# Patient Record
Sex: FEMALE | Race: WHITE | NOT HISPANIC OR LATINO | URBAN - NONMETROPOLITAN AREA
[De-identification: names, ages, dates, MRNs, and addresses within clinical notes are randomized per-mention and may not be internally consistent; named-entity substitution may affect disease eponyms.]

---

## 2016-09-01 PROBLEM — H26.491: Noted: 2019-04-09

## 2016-09-01 PROBLEM — Z96.1: Noted: 2018-10-18

## 2016-09-01 PROBLEM — H02.879: Noted: 2018-10-18

## 2016-09-01 PROBLEM — Z96.1: Noted: 2019-04-09

## 2016-09-01 PROBLEM — H43.813: Noted: 2018-10-18

## 2016-09-01 PROBLEM — H52.4: Noted: 2018-10-18

## 2019-04-09 ENCOUNTER — IMPORTED ENCOUNTER (OUTPATIENT)
Dept: URBAN - NONMETROPOLITAN AREA CLINIC 1 | Facility: CLINIC | Age: 79
End: 2019-04-09

## 2019-04-09 PROCEDURE — 92012 INTRM OPH EXAM EST PATIENT: CPT

## 2019-04-09 NOTE — PATIENT DISCUSSION
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 - The risk of retinal detachment in patients with PVDs was discussed with the patient and the warning signs of retinal detachment were carefully reviewed with the patient. - The patient was warned to return to the office or contact the ophthalmologist on call immediately if they experience signs of retinal detachment or changes in vision noted from today. - Continue to monitor.  Patient defers MR today 4/9/19; 's Notes: MR  3/3/16 again 9/1/16 @ n/c deferred 4/9/19DFE   3/2/17[facility] 9/7/17

## 2020-06-03 ENCOUNTER — IMPORTED ENCOUNTER (OUTPATIENT)
Dept: URBAN - NONMETROPOLITAN AREA CLINIC 1 | Facility: CLINIC | Age: 80
End: 2020-06-03

## 2020-06-03 PROCEDURE — 92014 COMPRE OPH EXAM EST PT 1/>: CPT

## 2020-06-03 NOTE — PATIENT DISCUSSION
Type II DM dx in 2007- Discussed diagnosis in detail with patient - Stressed importance of good blood sugar control - No diabetic retinopathy seen today on dilated exam- Controlled with oral medication and diet at this time- Last AlC and blood sugar are unknown (patient states that she hates needles)- Letter to Viky Falcon - Continue to monitor Myokymia OS- Discussed diagnosis in detail with patient- Asymptomatic at this time- Continue to monitorPseudophakia OU with PCO OD - Discussed diagnosis in detail with patient- s/p YAG PC OU but OD shows PCO today but no treatment needed at this time - Continue to monitorPVD OU: - Discussed findings of exam in detail with the patient. - The risk of retinal detachment in patients with PVDs was discussed with the patient and the warning signs of retinal detachment were carefully reviewed with the patient. - The patient was warned to return to the office or contact the ophthalmologist on call immediately if they experience signs of retinal detachment or changes in vision noted from today.  - Continue to monitor.; 's Notes:   3/3/16 again 9/1/16 @ n/c deferred 4/9/19DFE   06/02/20[facility] 9/7/17

## 2022-04-10 ASSESSMENT — VISUAL ACUITY
OD_CC: 20/20
OS_CC: 20/40-
OS_CC: 20/30-
OD_CC: 20/30-

## 2022-04-10 ASSESSMENT — TONOMETRY
OD_IOP_MMHG: 14
OD_IOP_MMHG: 14
OS_IOP_MMHG: 14
OS_IOP_MMHG: 15